# Patient Record
(demographics unavailable — no encounter records)

---

## 2024-10-22 NOTE — DISCUSSION/SUMMARY
[FreeTextEntry1] : 42 y.o  (LMP (10/9/2024) presenting for evaluation of irregular spotting   #AUB - discussed etiologies of abnormal uterine bleeding including hormonal vs structural vs cytological - hormonal causes such as thyroid abnormalities, irregularities in menopausal hormones, and prolactin levels were reviewed - structural abnormalities including fibroids, polyps, and pre-cancerous lesions were reviewed. - discussed role of lab work for hormonal evaluation (TSH/PRL), she was agreeable, collected today - discussed role of TVUS for evaluation of structural causes, she was agreeable, ordered today - Discussed initiation of oral iron due to persistent bleeding. Denies any CP/SOB/palpitations - RTO after US

## 2024-10-22 NOTE — COUNSELING
[Nutrition/ Exercise/ Weight Management] : nutrition, exercise, weight management [Body Image] : body image [Confidentiality] : confidentiality

## 2024-10-22 NOTE — HISTORY OF PRESENT ILLNESS
[FreeTextEntry1] : 42 y.o  (LMP (10/9/2024) presenting for evaluation of irregular spotting  Over the past 3 months, patient has been experiencing irregular spotting for after her cycles  If traditionally cycle lasts for 5-7 days, will now experience 1 week of spotting roughly 1 week after regular cycle. Most recently, had cycle on 10/9, and had one week of spotting last week. Denies any pelvic pain, new medications, weight gain/loss Currently denies any spotitng.

## 2024-11-11 NOTE — HISTORY OF PRESENT ILLNESS
[FreeTextEntry1] : 42 y.o  (LMP (10/9/2024) presenting for evaluation of AUB  No acute concerns at this time.

## 2024-11-11 NOTE — DISCUSSION/SUMMARY
[FreeTextEntry1] : 42 y.o  (LMP (10/9/2024) presenting for evaluation of AUB  Plan: - Pelvic US reviewed with patient: Uterine Texture: Heterogenous. Fibroids intramural 1.0 x 1.3 x 1.3 cm, subserosal 4.7 x 4.2 x 3.7 cm, 3.3 x 3.6 x 3.5 cm and 3.9 x 3.2 x 2.9 cm. Cervix nabothian cysts. Endometrium: 1.5 cm in thickness, which is thickened. - Discussed thickened lining may be in relation to when sono was performed in cycle (states it was 1-2 weeks after period). Repeat US in 2-3 months  - Hormonal testing-WNL - Discussed AUB-L and treatment options (expt, medical and surgical) - Medical treatment options for uterine leiomyomas include agents that address only bleeding symptoms (gonadotropin-releasing hormone [GnRH] antagonists, levonorgestrel-releasing intrauterine devices [LNG-IUDs], contraceptive steroids, and tranexamic acid) and medications that reduce both bleeding and leiomyoma size (GnRH agonists and selective progesterone receptor modulators) discussed with patient - Surgical options including: UAE, myomectomy and hysterectomy discussed as well - Patient would like to try depo-provera at this time. AE discussed - RTO for depo

## 2024-11-11 NOTE — COUNSELING
[Nutrition/ Exercise/ Weight Management] : nutrition, exercise, weight management [Body Image] : body image [Vitamins/Supplements] : vitamins/supplements [Confidentiality] : confidentiality [STD (testing, results, tx)] : STD (testing, results, tx)

## 2024-11-11 NOTE — PHYSICAL EXAM
[Chaperone Present] : A chaperone was present in the examining room during all aspects of the physical examination [[id number]] : A chaperone was present during the pelvic exam. [Appropriately responsive] : appropriately responsive [Alert] : alert [No Acute Distress] : no acute distress [Oriented x3] : oriented x3

## 2024-11-25 NOTE — DISCUSSION/SUMMARY
[FreeTextEntry1] : 43 y.o  (LMP (2024) presenting for evaluation of AUB-L follow up  #AUB-L -Discussed treatment options for AUB-L, patient now considering starting [facility] OCPs - she verbalized understanding and desire for [facility] OCP's  - common side effects of combined OCPs were reviewed  - discussed typical effectiveness rates of 91%  - instructed in the correct use of OCPs and what to do in the event of missed dose  - counseled about the reduced contraceptive effectiveness if doses are missed and the recommendation to use condom protection for 1 week after the missed dose - counseled on the risk of blood clot and stroke, but that the risk of stroke from pregnancy outweighs that of combined OCP  - she denies history of blood clots, hypertension, CVA, migraine with aura, breast cancer, liver disease, gall bladder disease, and family history of first degree relative with VTE or CVA or GYN malignancy  - denies hx of or current tobacco use  - she is aware that combined oral contraception does not protect against sexually transmitted diseases and she was encouraged to use condoms with her contraceptive if she is at risk for an STD  - she was also told to call with any problematic side-effects to discuss management options or changing to another contraceptive method prior to discontinuing  - junel 1/20 mg/mcg QD sent to pharmacy - RTO 3 months for follow up

## 2024-11-25 NOTE — HISTORY OF PRESENT ILLNESS
[FreeTextEntry1] : 43 y.o  (LMP (2024) presenting for evaluation of AUB  Patient states that she was talking with partner and friends, I would like to try SUKUMAR instead of Depo.  Concerned about weight gain with Depo-Provera

## 2025-03-29 NOTE — HISTORY OF PRESENT ILLNESS
[FreeTextEntry1] : 43 y.o  (LMP (2025) presenting for CHC follow up   Doing well with CHC. Reports improved menstrual bleeding and pelvic pain with use Denies any adverse side effects with use.

## 2025-03-29 NOTE — DISCUSSION/SUMMARY
[FreeTextEntry1] : 43 y.o  (LMP (2025) presenting for CHC follow up  Plan: - VSS - Tolerating JUnel 1/20 well. Denies any side effects with use.  - Junel 1/20 refill sent to pharmacy - RTO for routine annual

## 2025-07-05 NOTE — ASSESSMENT
[FreeTextEntry1] : Ninfa is a 43-year-old -1-0-3 presenting for AUB follow-up.  States that she was initially doing well after last visit, but recently presented to [facility] after experiencing 1 day of heavy bleeding.  At the time was told that her ultrasound demonstrated fibroids and was advised to follow-up with GYN for biopsy.  States that bleeding has lessened in quality and severity.  Now reporting mild to moderate dark brown bleeding without passage of large clots.  Denies any shortness of breath fatigue or chest pain.   Plan: - Does not have physical records from emergency department visit.  Labs were present on patient's phone, ultrasound demonstrated fibroid uterus with mildly thickened endometrial lining. - discussed possible etiology of thickened endometrial lining, including proliferative endometrium 2/2 hormonal changes, hyperplasia, polyp or other structural abnormalities, and malignancy - discussed role and benefit of endometrial biopsy in these situations for histological evaluation of endometrium and diagnosis as well as management options - discussed risks associated with EMB including insufficient collection, inability to complete procedure, bleeding, infection, uterine perforation and trauma to surrounding structures including probability (low) - discussed immediate and delayed side effects and precautions with associated with procedure including spotting and cramping that should resolve within 2 days, given recommendations for Tylenol and/or Motrin PRN for cramping - patient verbalized understanding and desire for EMB without hysteroscopy, performed successfully in office, tolerated well - discussed specimen will be sent to lab for histopathological evaluation, results should be available within 2 weeks and I will f/u with her regarding results

## 2025-07-05 NOTE — PROCEDURE
[Endometrial Biopsy] : Endometrial biopsy [Time out performed] : Pre-procedure time out performed.  Patient's name, date of birth and procedure confirmed. [Consent Obtained] : Consent obtained [Irregular Bleeding] : irregular uterine bleeding [Risks] : risks [Benefits] : benefits [Alternatives] : alternatives [Patient] : patient [Infection] : infection [Bleeding] : bleeding [Allergic Reaction] : allergic reaction [Uterine Perforation] : uterine perforation [Pain] : pain [Negative] : negative pregnancy test [Ibuprofen ___ mg] : ibuprofen [unfilled] ~Umg [Betadine] : Betadine [___ mL Injected] : [unfilled] ~UmL of lidocaine [Without Epi] : without epinephrine [Ring Forceps] : Ring forceps [Required Dilation] : required dilation [Sounded to ___ cm] : sounded to [unfilled] ~Ucm [Anteverted] : anteverted [Moderate] : moderate [Specimen Collected] : collected [Sent to Pathology] : placed in buffered formalin and sent for pathology [Tolerated Well] : Patient tolerated the procedure well [No Complications] : No complications [LMPDate] : 06/20/2025

## 2025-07-19 NOTE — HISTORY OF PRESENT ILLNESS
[FreeTextEntry1] : Noris is a 43-year-old -1-0-3 (25) presenting for AUB/hysteroscopy follow up  Denies any further episodes of irregular bleeding since visit. Most recent menses was lighter and shorter than prior cycle. Denies any pelvic cramping pain

## 2025-07-19 NOTE — DISCUSSION/SUMMARY
[FreeTextEntry1] : Noris is a 43-year-old -1-0-3 (25) presenting for AUB/hysteroscopy follow up   Plan: - Pathology: Inactive endometrium with pseudodecidualized stroma, suggestive of exogenous hormonal effect - Reviewed results in detail with patient and partner - Discussed increasing [facility] dose to aid with irregular bleeding with menses. Pt does not want to explore surgical options at this time - Junel 1.5/30 sent to pharmacy.  - RTO 3 mo for follow up. ED bleeding precautions reviewed